# Patient Record
Sex: FEMALE | Race: WHITE | Employment: UNEMPLOYED | URBAN - METROPOLITAN AREA
[De-identification: names, ages, dates, MRNs, and addresses within clinical notes are randomized per-mention and may not be internally consistent; named-entity substitution may affect disease eponyms.]

---

## 2017-03-30 ENCOUNTER — TELEPHONE (OUTPATIENT)
Dept: FAMILY MEDICINE | Facility: CLINIC | Age: 62
End: 2017-03-30

## 2017-03-30 DIAGNOSIS — I25.10 CORONARY ARTERY CALCIFICATION: Primary | ICD-10-CM

## 2017-10-19 ENCOUNTER — OFFICE VISIT (OUTPATIENT)
Dept: FAMILY MEDICINE | Facility: CLINIC | Age: 62
End: 2017-10-19
Payer: COMMERCIAL

## 2017-10-19 VITALS
WEIGHT: 153 LBS | OXYGEN SATURATION: 98 % | RESPIRATION RATE: 16 BRPM | HEART RATE: 85 BPM | HEIGHT: 67 IN | DIASTOLIC BLOOD PRESSURE: 84 MMHG | TEMPERATURE: 98 F | SYSTOLIC BLOOD PRESSURE: 134 MMHG | BODY MASS INDEX: 24.01 KG/M2

## 2017-10-19 DIAGNOSIS — Z00.00 ROUTINE GENERAL MEDICAL EXAMINATION AT A HEALTH CARE FACILITY: Primary | ICD-10-CM

## 2017-10-19 DIAGNOSIS — Z12.11 SCREEN FOR COLON CANCER: ICD-10-CM

## 2017-10-19 DIAGNOSIS — Z23 NEED FOR PROPHYLACTIC VACCINATION AND INOCULATION AGAINST INFLUENZA: ICD-10-CM

## 2017-10-19 DIAGNOSIS — I25.10 CORONARY ARTERY CALCIFICATION: ICD-10-CM

## 2017-10-19 DIAGNOSIS — Z11.59 NEED FOR HEPATITIS C SCREENING TEST: ICD-10-CM

## 2017-10-19 DIAGNOSIS — E78.5 HYPERLIPIDEMIA LDL GOAL <130: ICD-10-CM

## 2017-10-19 LAB
ERYTHROCYTE [DISTWIDTH] IN BLOOD BY AUTOMATED COUNT: 13.4 % (ref 10–15)
HCT VFR BLD AUTO: 41.1 % (ref 35–47)
HGB BLD-MCNC: 13.8 G/DL (ref 11.7–15.7)
MCH RBC QN AUTO: 31.5 PG (ref 26.5–33)
MCHC RBC AUTO-ENTMCNC: 33.6 G/DL (ref 31.5–36.5)
MCV RBC AUTO: 94 FL (ref 78–100)
PLATELET # BLD AUTO: 252 10E9/L (ref 150–450)
RBC # BLD AUTO: 4.38 10E12/L (ref 3.8–5.2)
WBC # BLD AUTO: 4.3 10E9/L (ref 4–11)

## 2017-10-19 PROCEDURE — 80053 COMPREHEN METABOLIC PANEL: CPT | Performed by: INTERNAL MEDICINE

## 2017-10-19 PROCEDURE — 36415 COLL VENOUS BLD VENIPUNCTURE: CPT | Performed by: INTERNAL MEDICINE

## 2017-10-19 PROCEDURE — 80061 LIPID PANEL: CPT | Performed by: INTERNAL MEDICINE

## 2017-10-19 PROCEDURE — 86803 HEPATITIS C AB TEST: CPT | Performed by: INTERNAL MEDICINE

## 2017-10-19 PROCEDURE — 99396 PREV VISIT EST AGE 40-64: CPT | Performed by: INTERNAL MEDICINE

## 2017-10-19 PROCEDURE — 85027 COMPLETE CBC AUTOMATED: CPT | Performed by: INTERNAL MEDICINE

## 2017-10-19 NOTE — MR AVS SNAPSHOT
After Visit Summary   10/19/2017    Lanie Pinto    MRN: [id number]           Patient Information     Date Of Birth          1955        Visit Information        Provider Department      10/19/2017 10:30 AM Schuyler Guzman MD [facility]        Today's Diagnoses     Routine general medical examination at a health care facility    -  1    Hyperlipidemia LDL goal <130        Coronary artery calcification        Need for prophylactic vaccination and inoculation against influenza        Need for hepatitis C screening test        Screen for colon cancer          Care Instructions      Preventive Health Recommendations  Female Ages 50 - 64    Yearly exam: See your health care provider every year in order to  o Review health changes.   o Discuss preventive care.    o Review your medicines if your doctor has prescribed any.      Get a Pap test every three years (unless you have an abnormal result and your provider advises testing more often).    If you get Pap tests with HPV test, you only need to test every 5 years, unless you have an abnormal result.     You do not need a Pap test if your uterus was removed (hysterectomy) and you have not had cancer.    You should be tested each year for STDs (sexually transmitted diseases) if you're at risk.     Have a mammogram every 1 to 2 years.    Have a colonoscopy at age 50, or have a yearly FIT test (stool test). These exams screen for colon cancer.      Have a cholesterol test every 5 years, or more often if advised.    Have a diabetes test (fasting glucose) every three years. If you are at risk for diabetes, you should have this test more often.     If you are at risk for osteoporosis (brittle bone disease), think about having a bone density scan (DEXA).    Shots: Get a flu shot each year. Get a tetanus shot every 10 years.    Nutrition:     Eat at least 5 servings of fruits and vegetables each day.    Eat whole-grain bread, whole-wheat  pasta and brown rice instead of white grains and rice.    Talk to your provider about Calcium and Vitamin D.     Lifestyle    Exercise at least 150 minutes a week (30 minutes a day, 5 days a week). This will help you control your weight and prevent disease.    Limit alcohol to one drink per day.    No smoking.     Wear sunscreen to prevent skin cancer.     See your dentist every six months for an exam and cleaning.    See your eye doctor every 1 to 2 years.            Follow-ups after your visit        Additional Services     GASTROENTEROLOGY ADULT REF PROCEDURE ONLY       Last Lab Result: Creatinine (mg/dL)       Date                     Value                 10/13/2016               0.82             ----------  Body mass index is 24.32 kg/(m^2).     Needed:  No  Language:  English    Patient will be contacted to schedule procedure.     Please be aware that coverage of these services is subject to the terms and limitations of your health insurance plan.  Call member services at your health plan with any benefit or coverage questions.  Any procedures must be performed at a [facility] facility OR coordinated by your clinic's referral office.    Please bring the following with you to your appointment:    (1) Any X-Rays, CTs or MRIs which have been performed.  Contact the facility where they were done to arrange for  prior to your scheduled appointment.    (2) List of current medications   (3) This referral request   (4) Any documents/labs given to you for this referral                  Follow-up notes from your care team     Return in about 1 year (around 10/19/2018) for Physical Exam.      Who to contact     If you have questions or need follow up information about today's clinic visit or your schedule please contact [facility] directly at [phone].  Normal or non-critical lab and imaging results will be communicated to you by MyChart, letter or phone within 4 business days after the  "clinic has received the results. If you do not hear from us within 7 days, please contact the clinic through [employer] or phone. If you have a critical or abnormal lab result, we will notify you by phone as soon as possible.  Submit refill requests through [employer] or call your pharmacy and they will forward the refill request to us. Please allow 3 business days for your refill to be completed.          Additional Information About Your Visit        [employer] Information     [employer] gives you secure access to your electronic health record. If you see a primary care provider, you can also send messages to your care team and make appointments. If you have questions, please call your primary care clinic.  If you do not have a primary care provider, please call [phone] and they will assist you.        Care EveryWhere ID     This is your Care EveryWhere ID. This could be used by other organizations to access your [facility] medical records  [id number]        Your Vitals Were     Pulse Temperature Respirations Height Pulse Oximetry BMI (Body Mass Index)    85 98  F (36.7  C) (Oral) 16 5' 6.5\" (1.689 m) 98% 24.32 kg/m2       Blood Pressure from Last 3 Encounters:   10/19/17 134/84   12/15/16 131/85   10/13/16 122/78    Weight from Last 3 Encounters:   10/19/17 153 lb (69.4 kg)   12/15/16 160 lb (72.6 kg)   10/13/16 160 lb (72.6 kg)              We Performed the Following     CBC with platelets     Comprehensive metabolic panel     GASTROENTEROLOGY ADULT REF PROCEDURE ONLY     Hepatitis C Screen Reflex to HCV RNA Quant and Genotype     Lipid panel reflex to direct LDL          Today's Medication Changes          These changes are accurate as of: 10/19/17 11:17 AM.  If you have any questions, ask your nurse or doctor.               Stop taking these medicines if you haven't already. Please contact your care team if you have questions.     aspirin 81 MG tablet   Stopped by:  Schuyler Guzman MD                    Primary Care " Provider Office Phone # Fax #    Schuyler Guzman [phone]       [facility] [address] [facility] [[email or web address]] [address]  [facility] [address]        Equal Access to Services     RUMA MIKE : Hadii aad ku hadchongo Soomaali, waaxda luqadaha, qaybta kaalmada adeegyada, waxwest idiin marcien cheryl boone layung pinedo. So [facility] [phone].    ATENCIÓN: Si habla español, tiene a bhatia disposición servicios gratuitos de asistencia lingüística. Llame al [phone].    We comply with applicable federal civil rights laws and [place] laws. We do not discriminate on the basis of race, color, national origin, age, disability, sex, sexual orientation, or gender identity.            Thank you!     Thank you for choosing [facility]  for your care. Our goal is always to provide you with excellent care. Hearing back from our patients is one way we can continue to improve our services. Please take a few minutes to complete the written survey that you may receive in the mail after your visit with us. Thank you!             Your Updated Medication List - Protect others around you: Learn how to safely use, store and throw away your medicines at www.disposemymeds.org.          This list is accurate as of: 10/19/17 11:17 AM.  Always use your most recent med list.                   Brand Name Dispense Instructions for use Diagnosis    estradiol 0.05 MG/24HR BIW patch    VIVELLE-DOT    7.84    1 PATCH  WEEKLY        METROGEL 1 % gel   Generic drug:  metroNIDAZOLE     60 g    Apply topically daily        metronid-tetracyc-bis subsal Misc Misc      Patient takes medication PRN upon flare ups. 500 mg daily x 1 week.

## 2017-10-19 NOTE — PROGRESS NOTES
SUBJECTIVE:   CC: Lanie Pinto is an 62 year old woman who presents for preventive health visit.     Healthy Habits:    Do you get at least three servings of calcium containing foods daily (dairy, green leafy vegetables, etc.)? no, taking calcium and/or vitamin D supplement: no    Amount of exercise or daily activities, outside of work: 4 day(s) per week    Problems taking medications regularly No    Medication side effects: No    Have you had an eye exam in the past two years? yes    Do you see a dentist twice per year? yes    Do you have sleep apnea, excessive snoring or daytime drowsiness?no          Today's PHQ-2 Score: PHQ-2 ( 1999 Pfizer) 10/19/2017 10/13/2016   Q1: Little interest or pleasure in doing things 0 0   Q2: Feeling down, depressed or hopeless 0 0   PHQ-2 Score 0 0       Abuse: Current or Past(Physical, Sexual or Emotional)- No  Do you feel safe in your environment - Yes    Social History   Substance Use Topics     Smoking status: Never Smoker     Smokeless tobacco: Never Used     Alcohol use 0.0 oz/week     0 Standard drinks or equivalent per week      Comment: 0-6 DRINKS PER WEEK     The patient does not drink >3 drinks per day nor >7 drinks per week.    Reviewed orders with patient.  Reviewed health maintenance and updated orders accordingly - Yes  Patient Active Problem List   Diagnosis     Mixed hyperlipidemia     NEAR SYNCOPE SPELLS, OCC, BRIEF.     Family history of coronary artery disease     Carpal tunnel syndrome     HYPERLIPIDEMIA LDL GOAL <130     Coronary artery calcification     Past Surgical History:   Procedure Laterality Date     C NONSPECIFIC PROCEDURE  9/15/81    LAPAROSCOPY - [facility]     C NONSPECIFIC PROCEDURE  06/23/89    D&C  - [facility]     C NONSPECIFIC PROCEDURE  94/95    VOMITING & DIARRHEA - ER [facility]     C NONSPECIFIC PROCEDURE      TONSILLECTOMY  - AS A CHILD     HYSTERECTOMY, PAP NO LONGER INDICATED  2005    FIBROIDS. BLEEDING       Social History   Substance  Use Topics     Smoking status: Never Smoker     Smokeless tobacco: Never Used     Alcohol use 0.0 oz/week     0 Standard drinks or equivalent per week      Comment: 0-6 DRINKS PER WEEK     Family History   Problem Relation Age of Onset     DIABETES Father      Hypertension Father      Alzheimer Disease Father      GASTROINTESTINAL DISEASE Father      HEART DISEASE Father      CEREBROVASCULAR DISEASE Mother      Thyroid Disease Paternal Grandmother          Current Outpatient Prescriptions   Medication Sig Dispense Refill     Metronid-Tetracyc-Bis Subsal MISC Patient takes medication PRN upon flare ups. 500 mg daily x 1 week.       metroNIDAZOLE (METROGEL) 1 % gel Apply topically daily 60 g 11     ESTRADIOL 0.05 MG/24HR TD PTTW 1 PATCH  WEEKLY 7.84 0     No Known Allergies            Reviewed and updated as needed this visit by clinical staffTobacco  Allergies  Meds  Med Hx  Surg Hx  Fam Hx  Soc Hx        Reviewed and updated as needed this visit by Provider        Past Medical History:   Diagnosis Date     Carpal tunnel syndrome 1/6/2008     Excessive or frequent menstruation      FAMILY HX CARDIOVAS DIS NEC 9/12/2007     FEMALE GENITAL DIS 1978    PID      FEMALE GENITAL SYMPTOMS     PELVIC PAIN     GENITAL HERPES      LOWER LEG INJURY     R/KNEE     Mixed hyperlipidemia 5/10/2004     NEAR SYNCOPE SPELLS, OCC, BRIEF. 9/12/2007     OVARIAN CYST      PNEUMONIA, ORGANISM      Rosacea      UTERINE LEIOMYOMA       Past Surgical History:   Procedure Laterality Date     C NONSPECIFIC PROCEDURE  9/15/81    [facility]AROSCOPY - [facility]     C NONSPECIFIC PROCEDURE  06/23/89    D&C  - [facility]     C NONSPECIFIC PROCEDURE  94/95    VOMITING & DIARRHEA - ER [facility]     C NONSPECIFIC PROCEDURE      TONSILLECTOMY  - AS A CHILD     HYSTERECTOMY, PAP NO LONGER INDICATED  2005    FIBROIDS. BLEEDING       ROS:  A 12 organ systems ROS is negative    OBJECTIVE:   /84 (BP Location: Right arm, Patient Position: Chair, Cuff Size: Adult  "Regular)  Pulse 85  Temp 98  F (36.7  C) (Oral)  Resp 16  Ht 5' 6.5\" (1.689 m)  Wt 153 lb (69.4 kg)  SpO2 98%  BMI 24.32 kg/m2  EXAM:  GENERAL APPEARANCE: healthy, alert and no distress  EYES: Eyes grossly normal to inspection, PERRL and conjunctivae and sclerae normal  HENT: ear canals and TM's normal, nose and mouth without ulcers or lesions, oropharynx clear and oral mucous membranes moist  NECK: no adenopathy, no asymmetry, masses, or scars and thyroid normal to palpation  RESP: lungs clear to auscultation - no rales, rhonchi or wheezes  BREAST: normal without masses, tenderness or nipple discharge and no palpable axillary masses or adenopathy  CV: regular rate and rhythm, normal S1 S2, no S3 or S4, no murmur, click or rub, no peripheral edema and peripheral pulses strong  ABDOMEN: soft, nontender, no hepatosplenomegaly, no masses and bowel sounds normal  MS: no musculoskeletal defects are noted and gait is age appropriate without ataxia  SKIN: no suspicious lesions or rashes  NEURO: Normal strength and tone, sensory exam grossly normal, mentation intact and speech normal  PSYCH: mentation appears normal and affect normal/bright    ASSESSMENT/PLAN:   1. Routine general medical examination at a health care facility    - Comprehensive metabolic panel  - CBC with platelets    2. Hyperlipidemia LDL goal <130  She has modified diet and would prefer to remain off statin threrapy  Start statin based on 10 year risk   - Lipid panel reflex to direct LDL    3. Coronary artery calcification  See discussion above     4. Need for prophylactic vaccination and inoculation against influenza  She declines flu shot today     5. Need for hepatitis C screening test    - Hepatitis C Screen Reflex to HCV RNA Quant and Genotype    6. Screen for colon cancer  Due for screening exam   - GASTROENTEROLOGY ADULT REF PROCEDURE ONLY    COUNSELING:   Reviewed preventive health counseling, as reflected in patient instructions  Special " "attention given to:        Regular exercise       Healthy diet/nutrition       Immunizations    Declined flu shot          Colon cancer screening       Consider Hep C screening for patients born between 1945 and 1965      BP Screening:   Last 3 BP Readings:    BP Readings from Last 3 Encounters:   10/19/17 134/84   12/15/16 131/85   10/13/16 122/78       The following was recommended to the patient:  Re-screen BP within a year and recommended lifestyle modifications     reports that she has never smoked. She has never used smokeless tobacco.    Estimated body mass index is 24.32 kg/(m^2) as calculated from the following:    Height as of this encounter: 5' 6.5\" (1.689 m).    Weight as of this encounter: 153 lb (69.4 kg).         Counseling Resources:  ATP IV Guidelines  Pooled Cohorts Equation Calculator  Breast Cancer Risk Calculator  FRAX Risk Assessment  ICSI Preventive Guidelines  Dietary Guidelines for Americans, 2010  [employer] MyPlate  ASA Prophylaxis  Lung CA Screening    Schuyler Guzman MD  [facility]    The 10-year ASCVD risk score (Iman FISCHER Jr, et al., 2013) is: 6.6%    Values used to calculate the score:      Age: 62 years      Sex: Female      Is Non- : No      Diabetic: No      Tobacco smoker: No      Systolic Blood Pressure: 134 mmHg      Is BP treated: No      HDL Cholesterol: 64 mg/dL      Total Cholesterol: 314 mg/dL   "

## 2017-10-19 NOTE — NURSING NOTE
"Chief Complaint   Patient presents with     Physical       Initial /84 (BP Location: Right arm, Patient Position: Chair, Cuff Size: Adult Regular)  Pulse 85  Temp 98  F (36.7  C) (Oral)  Resp 16  Ht 5' 6.5\" (1.689 m)  Wt 153 lb (69.4 kg)  SpO2 98%  BMI 24.32 kg/m2 Estimated body mass index is 24.32 kg/(m^2) as calculated from the following:    Height as of this encounter: 5' 6.5\" (1.689 m).    Weight as of this encounter: 153 lb (69.4 kg).  Medication Reconciliation: complete   Maricruz Valle CMA (AAMA)      "

## 2017-10-19 NOTE — LETTER
"[facility]  [address] [facility]  [address]  [place]  [address]  Tel: [phone]    October 23, 2017    Lanie Pinto  [address] [place] [address] [address] [place] [address]        Dear Ms. Pinto,    The following letter pertains to your most recent diagnostic tests:    -Liver and gallbladder tests are normal for you. (ALT,AST, Alk phos, bilirubin), kidney function is normal for you (Creatinine, GFR), Sodium is normal, Potassium is normal for you, Calcium is normal for you, Glucose (blood sugar) is normal for you.      -Your total cholesterol is 302 which is above your goal of total cholesterol less than 200.    -Your triglycerides are 128 which are at your goal of triglycerides less than 150.    -Your HDL or \"good cholesterol\" is 68 which is at your goal of HDL cholesterol greater than 50.    -Your LDL cholesterol or \"bad cholesterol\" is 208 which is above your goal of LDL cholesterol less than <160.  Your LDL goal is based on your risk factors for artery disease.     -Your hepatitis C screening test is negative.  You do not have hepatitis C.     -Your complete blood counts including your hemoglobin returned normal for you.         Bottom line:  Your cholesterol has improved very minimally since last check.  It is not mandatory to start a statin cholesterol lowering medication based on these results.   We need to recheck in one year.  The rest of your lab results look fine.        Follow up:  Schedule an appointment for a physical examination with fasting blood tests in one year's time, or return sooner if new questions, symptoms or problems arise.     If you have any further questions or problems, please contact our office.      Sincerely,    Schuyler Guzman MD/ Arpita MANCUSO CMA  Results for orders placed or performed in visit on 10/19/17   Hepatitis C Screen Reflex to HCV RNA Quant and Genotype   Result Value Ref Range    Hepatitis C Antibody Nonreactive NR^Nonreactive   Comprehensive metabolic panel "   Result Value Ref Range    Sodium 139 133 - 144 mmol/L    Potassium 4.3 3.4 - 5.3 mmol/L    Chloride 106 94 - 109 mmol/L    Carbon Dioxide 24 20 - 32 mmol/L    Anion Gap 9 3 - 14 mmol/L    Glucose 94 70 - 99 mg/dL    Urea Nitrogen 14 7 - 30 mg/dL    Creatinine 0.80 0.52 - 1.04 mg/dL    GFR Estimate 72 >60 mL/min/1.7m2    GFR Estimate If Black 87 >60 mL/min/1.7m2    Calcium 9.6 8.5 - 10.1 mg/dL    Bilirubin Total 0.6 0.2 - 1.3 mg/dL    Albumin 4.4 3.4 - 5.0 g/dL    Protein Total 8.1 6.8 - 8.8 g/dL    Alkaline Phosphatase 86 40 - 150 U/L    ALT 32 0 - 50 U/L    AST 25 0 - 45 U/L   CBC with platelets   Result Value Ref Range    WBC 4.3 4.0 - 11.0 10e9/L    RBC Count 4.38 3.8 - 5.2 10e12/L    Hemoglobin 13.8 11.7 - 15.7 g/dL    Hematocrit 41.1 35.0 - 47.0 %    MCV 94 78 - 100 fl    MCH 31.5 26.5 - 33.0 pg    MCHC 33.6 31.5 - 36.5 g/dL    RDW 13.4 10.0 - 15.0 %    Platelet Count 252 150 - 450 10e9/L   Lipid panel reflex to direct LDL   Result Value Ref Range    Cholesterol 302 (H) <200 mg/dL    Triglycerides 128 <150 mg/dL    HDL Cholesterol 68 >49 mg/dL    LDL Cholesterol Calculated 208 (H) <100 mg/dL    Non HDL Cholesterol 234 (H) <130 mg/dL               Enclosure: Lab Results

## 2017-10-20 LAB
ALBUMIN SERPL-MCNC: 4.4 G/DL (ref 3.4–5)
ALP SERPL-CCNC: 86 U/L (ref 40–150)
ALT SERPL W P-5'-P-CCNC: 32 U/L (ref 0–50)
ANION GAP SERPL CALCULATED.3IONS-SCNC: 9 MMOL/L (ref 3–14)
AST SERPL W P-5'-P-CCNC: 25 U/L (ref 0–45)
BILIRUB SERPL-MCNC: 0.6 MG/DL (ref 0.2–1.3)
BUN SERPL-MCNC: 14 MG/DL (ref 7–30)
CALCIUM SERPL-MCNC: 9.6 MG/DL (ref 8.5–10.1)
CHLORIDE SERPL-SCNC: 106 MMOL/L (ref 94–109)
CHOLEST SERPL-MCNC: 302 MG/DL
CO2 SERPL-SCNC: 24 MMOL/L (ref 20–32)
CREAT SERPL-MCNC: 0.8 MG/DL (ref 0.52–1.04)
GFR SERPL CREATININE-BSD FRML MDRD: 72 ML/MIN/1.7M2
GLUCOSE SERPL-MCNC: 94 MG/DL (ref 70–99)
HCV AB SERPL QL IA: NONREACTIVE
HDLC SERPL-MCNC: 68 MG/DL
LDLC SERPL CALC-MCNC: 208 MG/DL
NONHDLC SERPL-MCNC: 234 MG/DL
POTASSIUM SERPL-SCNC: 4.3 MMOL/L (ref 3.4–5.3)
PROT SERPL-MCNC: 8.1 G/DL (ref 6.8–8.8)
SODIUM SERPL-SCNC: 139 MMOL/L (ref 133–144)
TRIGL SERPL-MCNC: 128 MG/DL

## 2017-10-21 NOTE — PROGRESS NOTES
"The following letter pertains to your most recent diagnostic tests:    -Liver and gallbladder tests are normal for you. (ALT,AST, Alk phos, bilirubin), kidney function is normal for you (Creatinine, GFR), Sodium is normal, Potassium is normal for you, Calcium is normal for you, Glucose (blood sugar) is normal for you.      -Your total cholesterol is 302 which is above your goal of total cholesterol less than 200.    -Your triglycerides are 128 which are at your goal of triglycerides less than 150.    -Your HDL or \"good cholesterol\" is 68 which is at your goal of HDL cholesterol greater than 50.    -Your LDL cholesterol or \"bad cholesterol\" is 208 which is above your goal of LDL cholesterol less than <160.  Your LDL goal is based on your risk factors for artery disease.     -Your hepatitis C screening test is negative.  You do not have hepatitis C.     -Your complete blood counts including your hemoglobin returned normal for you.         Bottom line:  Your cholesterol has improved very minimally since last check.  It is not mandatory to start a statin cholesterol lowering medication based on these results.   We need to recheck in one year.  The rest of your lab results look fine.        Follow up:  Schedule an appointment for a physical examination with fasting blood tests in one year's time, or return sooner if new questions, symptoms or problems arise.       Sincerely,    Dr. Guzman    The 10-year ASCVD risk score (Imanmame FISCHER Jr, et al., 2013) is: 6.3%    Values used to calculate the score:      Age: 62 years      Sex: Female      Is Non- : No      Diabetic: No      Tobacco smoker: No      Systolic Blood Pressure: 134 mmHg      Is BP treated: No      HDL Cholesterol: 68 mg/dL      Total Cholesterol: 302 mg/dL"

## 2017-10-31 ENCOUNTER — TRANSFERRED RECORDS (OUTPATIENT)
Dept: HEALTH INFORMATION MANAGEMENT | Facility: CLINIC | Age: 62
End: 2017-10-31

## 2018-08-01 ENCOUNTER — TRANSFERRED RECORDS (OUTPATIENT)
Dept: HEALTH INFORMATION MANAGEMENT | Facility: CLINIC | Age: 63
End: 2018-08-01

## 2019-01-18 ENCOUNTER — TELEPHONE (OUTPATIENT)
Dept: FAMILY MEDICINE | Facility: CLINIC | Age: 64
End: 2019-01-18

## 2019-01-18 NOTE — TELEPHONE ENCOUNTER
Reason for call:  Patient reporting a symptom    Symptom or request: respiratory virus that has settled in sinuses. Lots of pressure in sinusus, nothing green coming out.  Not sure it is an infection.  She is if this is something serious,  She would rather not come in, she would like some advice.  She does not report any temperature     Duration (how long have symptoms been present): 2 weeks    Have you been treated for this before? No    Additional comments: she is getting better, but the pressure is bothering her    Phone Number patient can be reached at:  Home number on file [phone] (home)    Best Time:  any    Can we leave a detailed message on this number:  YES    Call taken on 1/18/2019 at 9:49 AM by Michaela Toth

## 2019-01-18 NOTE — TELEPHONE ENCOUNTER
Tried calling patient   Phone rang several times then went to busy signal  Will need to re-call    Laverne MANCUSO RN

## 2019-01-18 NOTE — TELEPHONE ENCOUNTER
Spoke with patient:  Virus x2 weeks this past Wed  Started with sore throat, went to chest  Felt much better in last couple of days   Except now it's went into sinuses   Still coughing  No fever   No green drainage   Last night started taking Mucinex Sinus - helps   Using essential oils, diffuser, humidifier   Pressure sensation   Denies redness/swelling in face   No vision changes   Just a couple of days     Discussed with patient home care instructions for sinus problems per [facility] triage. But advised she call back if green/yellow/brown drainage, any swelling/worsening of pain, fever, or symptoms persist. Pt agreeable to this    Laverne MANCUSO RN

## 2019-05-06 ASSESSMENT — ACTIVITIES OF DAILY LIVING (ADL): CURRENT_FUNCTION: NO ASSISTANCE NEEDED

## 2019-05-08 ENCOUNTER — OFFICE VISIT (OUTPATIENT)
Dept: FAMILY MEDICINE | Facility: CLINIC | Age: 64
End: 2019-05-08
Payer: COMMERCIAL

## 2019-05-08 VITALS
HEIGHT: 67 IN | OXYGEN SATURATION: 97 % | TEMPERATURE: 96.9 F | SYSTOLIC BLOOD PRESSURE: 123 MMHG | BODY MASS INDEX: 25.74 KG/M2 | WEIGHT: 164 LBS | HEART RATE: 94 BPM | DIASTOLIC BLOOD PRESSURE: 82 MMHG

## 2019-05-08 DIAGNOSIS — E78.5 HYPERLIPIDEMIA LDL GOAL <130: Primary | ICD-10-CM

## 2019-05-08 DIAGNOSIS — Z12.11 SCREENING FOR COLON CANCER: ICD-10-CM

## 2019-05-08 DIAGNOSIS — Z00.00 ROUTINE GENERAL MEDICAL EXAMINATION AT A HEALTH CARE FACILITY: ICD-10-CM

## 2019-05-08 DIAGNOSIS — Z11.4 ENCOUNTER FOR SCREENING FOR HIV: ICD-10-CM

## 2019-05-08 LAB
ALBUMIN SERPL-MCNC: 4.4 G/DL (ref 3.4–5)
ALP SERPL-CCNC: 82 U/L (ref 40–150)
ALT SERPL W P-5'-P-CCNC: 26 U/L (ref 0–50)
ANION GAP SERPL CALCULATED.3IONS-SCNC: 6 MMOL/L (ref 3–14)
AST SERPL W P-5'-P-CCNC: 21 U/L (ref 0–45)
BILIRUB SERPL-MCNC: 0.6 MG/DL (ref 0.2–1.3)
BUN SERPL-MCNC: 15 MG/DL (ref 7–30)
CALCIUM SERPL-MCNC: 9.6 MG/DL (ref 8.5–10.1)
CHLORIDE SERPL-SCNC: 106 MMOL/L (ref 94–109)
CHOLEST SERPL-MCNC: 364 MG/DL
CO2 SERPL-SCNC: 26 MMOL/L (ref 20–32)
CREAT SERPL-MCNC: 0.8 MG/DL (ref 0.52–1.04)
ERYTHROCYTE [DISTWIDTH] IN BLOOD BY AUTOMATED COUNT: 13.9 % (ref 10–15)
GFR SERPL CREATININE-BSD FRML MDRD: 77 ML/MIN/{1.73_M2}
GLUCOSE SERPL-MCNC: 99 MG/DL (ref 70–99)
HCT VFR BLD AUTO: 41.7 % (ref 35–47)
HDLC SERPL-MCNC: 63 MG/DL
HGB BLD-MCNC: 14.1 G/DL (ref 11.7–15.7)
HIV 1+2 AB+HIV1 P24 AG SERPL QL IA: NONREACTIVE
LDLC SERPL CALC-MCNC: 275 MG/DL
MCH RBC QN AUTO: 31.5 PG (ref 26.5–33)
MCHC RBC AUTO-ENTMCNC: 33.8 G/DL (ref 31.5–36.5)
MCV RBC AUTO: 93 FL (ref 78–100)
NONHDLC SERPL-MCNC: 301 MG/DL
PLATELET # BLD AUTO: 252 10E9/L (ref 150–450)
POTASSIUM SERPL-SCNC: 4 MMOL/L (ref 3.4–5.3)
PROT SERPL-MCNC: 8.3 G/DL (ref 6.8–8.8)
RBC # BLD AUTO: 4.47 10E12/L (ref 3.8–5.2)
SODIUM SERPL-SCNC: 138 MMOL/L (ref 133–144)
TRIGL SERPL-MCNC: 129 MG/DL
TSH SERPL DL<=0.005 MIU/L-ACNC: 1.97 MU/L (ref 0.4–4)
WBC # BLD AUTO: 4 10E9/L (ref 4–11)

## 2019-05-08 PROCEDURE — 80061 LIPID PANEL: CPT | Performed by: INTERNAL MEDICINE

## 2019-05-08 PROCEDURE — 36415 COLL VENOUS BLD VENIPUNCTURE: CPT | Performed by: INTERNAL MEDICINE

## 2019-05-08 PROCEDURE — 84443 ASSAY THYROID STIM HORMONE: CPT | Performed by: INTERNAL MEDICINE

## 2019-05-08 PROCEDURE — 80053 COMPREHEN METABOLIC PANEL: CPT | Performed by: INTERNAL MEDICINE

## 2019-05-08 PROCEDURE — 85027 COMPLETE CBC AUTOMATED: CPT | Performed by: INTERNAL MEDICINE

## 2019-05-08 PROCEDURE — G0402 INITIAL PREVENTIVE EXAM: HCPCS | Performed by: INTERNAL MEDICINE

## 2019-05-08 PROCEDURE — 90670 PCV13 VACCINE IM: CPT | Performed by: INTERNAL MEDICINE

## 2019-05-08 PROCEDURE — 87389 HIV-1 AG W/HIV-1&-2 AB AG IA: CPT | Performed by: INTERNAL MEDICINE

## 2019-05-08 PROCEDURE — G0009 ADMIN PNEUMOCOCCAL VACCINE: HCPCS | Performed by: INTERNAL MEDICINE

## 2019-05-08 RX ORDER — ROSUVASTATIN CALCIUM 5 MG/1
5 TABLET, COATED ORAL
Qty: 36 TABLET | Refills: 3 | Status: SHIPPED | OUTPATIENT
Start: 2019-05-09

## 2019-05-08 ASSESSMENT — MIFFLIN-ST. JEOR: SCORE: 1313.59

## 2019-05-08 ASSESSMENT — ACTIVITIES OF DAILY LIVING (ADL): CURRENT_FUNCTION: NO ASSISTANCE NEEDED

## 2019-05-08 NOTE — RESULT ENCOUNTER NOTE
"The following letter pertains to your most recent diagnostic tests:    -TSH (thyroid stimulating hormone) level is normal which indicates normal circulating thyroid hormone levels.      -Your total cholesterol is 364 which is ABOVE your goal of total cholesterol less than 200.    -Your triglycerides are 129 which are at your goal of triglycerides less than 150.    -Your HDL or \"good cholesterol\" is 63 which is at your goal of HDL cholesterol greater than 50.    -Your LDL cholesterol or \"bad cholesterol\" is 275 which is ABOVE your goal of LDL cholesterol less than <130.  Your LDL goal is based on your risk factors for artery disease.     -Liver and gallbladder tests are normal for you. (ALT,AST, Alk phos, bilirubin), kidney function is normal for you (Creatinine, GFR), Sodium is normal, Potassium is normal for you, Calcium is normal for you, Glucose (blood sugar) is normal for you.      -Your complete blood counts including your hemoglobin returned normal for you.         Bottom line: The cholesterol has worsened considerably since last check.  The cholesterol is now the highest that it has ever been.  With cholesterol levels this high, I would highly recommend starting at least a low-dose of a cholesterol-lowering medication to prevent cholesterol related atherosclerotic vascular disease which can lead to heart attacks and strokes.  Since you did not tolerate the atorvastatin that was previously prescribed, you could try Crestor (rosuvastatin).  We could start with a very low dose such as 5 mg 3 times weekly to see how you tolerate the drug.  I sent a prescription for that medication to your pharmacy.  If you do start the medication, you should return in 1 to 2 months to have your cholesterol rechecked on the medication.  As you know, these medications can sometimes cause muscle side effects.  If you have muscle pain or weakness associated with taking the medication, please stop the medication and contact me.  If " you would like to schedule a  follow-up office visit appointment to discuss this matter further before starting a new medication, you are more than welcome to do so.        Sincerely,    Dr. Guzman

## 2019-05-08 NOTE — LETTER
"[facility]  [address] [facility]  [address]  [place]  [address]  Tel: [phone]    May 9, 2019    Lanie Pinto  [address] [place] [address] [address] [place] [address]        Dear Ms. Pinto,    The following letter pertains to your most recent diagnostic tests:     -TSH (thyroid stimulating hormone) level is normal which indicates normal circulating thyroid hormone levels.       -Your total cholesterol is 364 which is ABOVE your goal of total cholesterol less than 200.     -Your triglycerides are 129 which are at your goal of triglycerides less than 150.     -Your HDL or \"good cholesterol\" is 63 which is at your goal of HDL cholesterol greater than 50.     -Your LDL cholesterol or \"bad cholesterol\" is 275 which is ABOVE your goal of LDL cholesterol less than <130.  Your LDL goal is based on your risk factors for artery disease.     -Liver and gallbladder tests are normal for you. (ALT,AST, Alk phos, bilirubin), kidney function is normal for you (Creatinine, GFR), Sodium is normal, Potassium is normal for you, Calcium is normal for you, Glucose (blood sugar) is normal for you.       -Your complete blood counts including your hemoglobin returned normal for you.         Bottom line: The cholesterol has worsened considerably since last check.  The cholesterol is now the highest that it has ever been.  With cholesterol levels this high, I would highly recommend starting at least a low-dose of a cholesterol-lowering medication to prevent cholesterol related atherosclerotic vascular disease which can lead to heart attacks and strokes.  Since you did not tolerate the atorvastatin that was previously prescribed, you could try Crestor (rosuvastatin).  We could start with a very low dose such as 5 mg 3 times weekly to see how you tolerate the drug.  I sent a prescription for that medication to your pharmacy.  If you do start the medication, you should return in 1 to 2 months to have your cholesterol rechecked on " the medication.  As you know, these medications can sometimes cause muscle side effects.  If you have muscle pain or weakness associated with taking the medication, please stop the medication and contact me.  If you would like to schedule a   follow-up office visit appointment to discuss this matter further before starting a new medication, you are more than welcome to do so.       Sincerely,    Schuyler Guzman MD          Enclosure: Lab Results

## 2019-05-08 NOTE — NURSING NOTE
Prior to injection, verified patient identity using patient's name and date of birth.  Due to injection administration, patient instructed to remain in clinic for 15 minutes  afterwards, and to report any adverse reaction to me immediately.    Prevnar 13    Drug Amount Wasted:  None.  Vial/Syringe: Syringe  Expiration Date:  09/20/20    Screening Questionnaire for Adult Immunization    Are you sick today?   Yes   Do you have allergies to medications, food, a vaccine component or latex?   No   Have you ever had a serious reaction after receiving a vaccination?   No   Do you have a long-term health problem with heart disease, lung disease, asthma, kidney disease, metabolic disease (e.g. diabetes), anemia, or other blood disorder?   No   Do you have cancer, leukemia, HIV/AIDS, or any other immune system problem?   No   In the past 3 months, have you taken medications that affect  your immune system, such as prednisone, other steroids, or anticancer drugs; drugs for the treatment of rheumatoid arthritis, Crohn s disease, or psoriasis; or have you had radiation treatments?   No   Have you had a seizure, or a brain or other nervous system problem?   No   During the past year, have you received a transfusion of blood or blood     products, or been given immune (gamma) globulin or antiviral drug?   No   For women: Are you pregnant or is there a chance you could become        pregnant during the next month?   No   Have you received any vaccinations in the past 4 weeks?   No     Immunization questionnaire answers were all negative.        Per orders of Dr. Hernandez, injection of Prevnar 13 given by Justyna Motta. Patient instructed to remain in clinic for 15 minutes afterwards, and to report any adverse reaction to me immediately.       Screening performed by Justyna Motta on 5/8/2019 at 10:44 AM.

## 2019-05-08 NOTE — PROGRESS NOTES
"SUBJECTIVE:   Lanie Pinto is a 63 year old female who presents for Preventive Visit.    Are you in the first 12 months of your Medicare coverage?  Yes,  Visual Acuity:  Right Eye: 20/20   Left Eye: 20/16  Both Eyes: 20/16    Healthy Habits:     In general, how would you rate your overall health?  Good    Frequency of exercise:  4-5 days/week    Duration of exercise:  30-45 minutes    Do you usually eat at least 4 servings of fruit and vegetables a day, include whole grains    & fiber and avoid regularly eating high fat or \"junk\" foods?  No    Taking medications regularly:  Not Applicable    Medication side effects:  Not applicable    Ability to successfully perform activities of daily living:  No assistance needed    Home Safety:  No safety concerns identified    Hearing Impairment:  Difficulty following a conversation in a noisy restaurant or crowded room and need to ask people to speak up or repeat themselves    In the past 6 months, have you been bothered by leaking of urine?  No    In general, how would you rate your overall mental or emotional health?  Good      PHQ-2 Total Score: 0    Additional concerns today:  Yes    Do you feel safe in your environment? Yes    Do you have a Health Care Directive? No: Advance care planning reviewed with patient; information given to patient to review.      Fall risk  Fallen 2 or more times in the past year?: No  Any fall with injury in the past year?: No    Cognitive Screening   1) Repeat 3 items (Leader, Season, Table)    2) Clock draw: NORMAL  3) 3 item recall: Recalls 2 objects   Results: NORMAL clock, 1-2 items recalled: COGNITIVE IMPAIRMENT LESS LIKELY    Mini-CogTM Arina Stewart. Licensed by the author for use in [facility]; reprinted with permission (kaye@.[facility]). All rights reserved.      Do you have sleep apnea, excessive snoring or daytime drowsiness?: no    Reviewed and updated as needed this visit by clinical staff  Tobacco  " Allergies  Meds         Reviewed and updated as needed this visit by Provider        Social History     Tobacco Use     Smoking status: Never Smoker     Smokeless tobacco: Never Used   Substance Use Topics     Alcohol use: Yes     Alcohol/week: 0.0 oz     Comment: 0-6 DRINKS PER WEEK         Alcohol Use 5/6/2019   Prescreen: >3 drinks/day or >7 drinks/week? Yes   Prescreen: >3 drinks/day or >7 drinks/week? -   AUDIT SCORE  5         Current providers sharing in care for this patient include:   Patient Care Team:  Schuyler Guzman MD as PCP - General (Internal Medicine)  Schuyler Guzman MD as Assigned PCP    The following health maintenance items are reviewed in Epic and correct as of today:  Health Maintenance   Topic Date Due     HIV SCREEN (SYSTEM ASSIGNED)  10/07/1971     ZOSTER IMMUNIZATION (1 of 2) 10/07/2003     ADVANCE DIRECTIVE PLANNING Q5 YRS  10/07/2008     MAMMO SCREEN Q2 YR (SYSTEM ASSIGNED)  07/01/2018     FALL RISK ASSESSMENT  10/07/2018     DEXA SCAN SCREENING (SYSTEM ASSIGNED)  10/07/2018     PNEUMOCOCCAL IMMUNIZATION 65+ LOW/MEDIUM RISK (1 of 2 - PCV13) 10/07/2018     MEDICARE ANNUAL WELLNESS VISIT  10/19/2018     COLONOSCOPY Q1 YR  10/31/2018     INFLUENZA VACCINE (Season Ended) 09/01/2019     LIPID SCREEN Q5 YR FEMALE (SYSTEM ASSIGNED)  10/19/2022     DTAP/TDAP/TD IMMUNIZATION (2 - Td) 10/31/2024     PHQ-2  Completed     HEPATITIS C SCREENING  Completed     IPV IMMUNIZATION  Aged Out     MENINGITIS IMMUNIZATION  Aged Out     Patient Active Problem List   Diagnosis     Mixed hyperlipidemia     NEAR SYNCOPE SPELLS, OCC, BRIEF.     Family history of coronary artery disease     Carpal tunnel syndrome     HYPERLIPIDEMIA LDL GOAL <130     Coronary artery calcification     Routine general medical examination at a health care facility     Past Surgical History:   Procedure Laterality Date     C NONSPECIFIC PROCEDURE  9/15/81    LAPAROSCOPY - [facility]     C NONSPECIFIC PROCEDURE  06/23/89    D&C  -  "[facility]     C NONSPECIFIC PROCEDURE  94/95    VOMITING & DIARRHEA - ER [facility]     C NONSPECIFIC PROCEDURE      TONSILLECTOMY  - AS A CHILD     HYSTERECTOMY, PAP NO LONGER INDICATED  2005    FIBROIDS. BLEEDING       Social History     Tobacco Use     Smoking status: Never Smoker     Smokeless tobacco: Never Used   Substance Use Topics     Alcohol use: Yes     Alcohol/week: 0.0 oz     Comment: 0-6 DRINKS PER WEEK     Family History   Problem Relation Age of Onset     Diabetes Father      Hypertension Father      Alzheimer Disease Father      Gastrointestinal Disease Father      Heart Disease Father      Cerebrovascular Disease Mother      Thyroid Disease Paternal Grandmother          Current Outpatient Medications   Medication Sig Dispense Refill     ESTRADIOL 0.05 MG/24HR TD PTTW 1 PATCH  WEEKLY 7.84 0     No Known Allergies  Pneumonia Vaccine:Adults age 65+ who have not received previous Pneumovax (PPSV23) or PCV13 as an adult: Should first be given PCV13 AND then should be given PPSV23 6-12 months after PCV13    Review of Systems  Constitutional, HEENT, cardiovascular, pulmonary, gi and gu systems are negative, except as otherwise noted.    OBJECTIVE:   /82 (BP Location: Right arm, Patient Position: Chair, Cuff Size: Adult Regular)   Pulse 94   Temp 96.9  F (36.1  C) (Tympanic)   Ht 1.689 m (5' 6.5\")   Wt 74.4 kg (164 lb)   SpO2 97%   BMI 26.07 kg/m   Estimated body mass index is 26.07 kg/m  as calculated from the following:    Height as of this encounter: 1.689 m (5' 6.5\").    Weight as of this encounter: 74.4 kg (164 lb).  Physical Exam  GENERAL APPEARANCE: healthy, alert and no distress  EYES: Eyes grossly normal to inspection, PERRL and conjunctivae and sclerae normal  HENT: ear canals and TM's normal, nose and mouth without ulcers or lesions, oropharynx clear and oral mucous membranes moist  NECK: no adenopathy, no asymmetry, masses, or scars and thyroid normal to palpation  RESP: lungs clear to " "auscultation - no rales, rhonchi or wheezes  CV: regular rate and rhythm, normal S1 S2, no S3 or S4, no murmur, click or rub, no peripheral edema and peripheral pulses strong  ABDOMEN: soft, nontender, no hepatosplenomegaly, no masses and bowel sounds normal  MS: no musculoskeletal defects are noted and gait is age appropriate without ataxia  SKIN: no suspicious lesions or rashes  NEURO: Normal strength and tone, sensory exam grossly normal, mentation intact and speech normal  PSYCH: mentation appears normal and affect normal/bright    Diagnostic Test Results:  Labs pending     ASSESSMENT / PLAN:   1. Routine general medical examination at a health care facility      2. Hyperlipidemia LDL goal <130    - Lipid panel reflex to direct LDL Fasting  - Comprehensive metabolic panel  - CBC with platelets  - TSH with free T4 reflex    3. Encounter for screening for HIV    - HIV Antigen Antibody Combo    4. Screening for colon cancer    - GASTROENTEROLOGY ADULT REF PROCEDURE ONLY Other; [facility] [phone]    End of Life Planning:  Patient currently has an advanced directive: No.  I have verified the patient's ablity to prepare an advanced directive/make health care decisions.  Literature was provided to assist patient in preparing an advanced directive.    COUNSELING:  Reviewed preventive health counseling, as reflected in patient instructions  Special attention given to:       Regular exercise       Healthy diet/nutrition       Immunizations    Vaccinated for: Pneumococcal 13 and Zoster vaccine recommended          has had bone mineral density screening with her GYN  Reminded to schedule colonoscopy to follow up on 1mm polyp noted in 10/17  Get Mammogram record from [facility]    BP Readings from Last 1 Encounters:   05/08/19 123/82     Estimated body mass index is 26.07 kg/m  as calculated from the following:    Height as of this encounter: 1.689 m (5' 6.5\").    Weight as of this encounter: 74.4 kg (164 lb).    BP Screening: "   Last 3 BP Readings:    BP Readings from Last 3 Encounters:   05/08/19 123/82   10/19/17 134/84   12/15/16 131/85       The following was recommended to the patient:  Re-screen BP within a year and recommended lifestyle modifications  Weight management plan: Discussed healthy diet and exercise guidelines     reports that she has never smoked. She has never used smokeless tobacco.      Appropriate preventive services were discussed with this patient, including applicable screening as appropriate for cardiovascular disease, diabetes, osteopenia/osteoporosis, and glaucoma.  As appropriate for age/gender, discussed screening for colorectal cancer, prostate cancer, breast cancer, and cervical cancer. Checklist reviewing preventive services available has been given to the patient.    Reviewed patients plan of care and provided an AVS. The Basic Care Plan (routine screening as documented in Health Maintenance) for Lanie meets the Care Plan requirement. This Care Plan has been established and reviewed with the Patient.    Counseling Resources:  ATP IV Guidelines  Pooled Cohorts Equation Calculator  Breast Cancer Risk Calculator  FRAX Risk Assessment  ICSI Preventive Guidelines  Dietary Guidelines for Americans, 2010  USDA's MyPlate  ASA Prophylaxis  Lung CA Screening    Schuyler Guzman MD  [facility]    Identified Health Risks:

## 2019-07-31 ENCOUNTER — TRANSFERRED RECORDS (OUTPATIENT)
Dept: HEALTH INFORMATION MANAGEMENT | Facility: CLINIC | Age: 64
End: 2019-07-31

## 2019-10-01 ENCOUNTER — HEALTH MAINTENANCE LETTER (OUTPATIENT)
Age: 64
End: 2019-10-01

## 2019-12-15 ENCOUNTER — HEALTH MAINTENANCE LETTER (OUTPATIENT)
Age: 64
End: 2019-12-15

## 2021-01-15 ENCOUNTER — HEALTH MAINTENANCE LETTER (OUTPATIENT)
Age: 66
End: 2021-01-15

## 2021-09-04 ENCOUNTER — HEALTH MAINTENANCE LETTER (OUTPATIENT)
Age: 66
End: 2021-09-04

## 2022-02-19 ENCOUNTER — HEALTH MAINTENANCE LETTER (OUTPATIENT)
Age: 67
End: 2022-02-19

## 2022-09-14 ENCOUNTER — TRANSFERRED RECORDS (OUTPATIENT)
Dept: HEALTH INFORMATION MANAGEMENT | Facility: CLINIC | Age: 67
End: 2022-09-14

## 2022-10-22 ENCOUNTER — HEALTH MAINTENANCE LETTER (OUTPATIENT)
Age: 67
End: 2022-10-22

## 2023-04-01 ENCOUNTER — HEALTH MAINTENANCE LETTER (OUTPATIENT)
Age: 68
End: 2023-04-01